# Patient Record
Sex: MALE | Race: BLACK OR AFRICAN AMERICAN | NOT HISPANIC OR LATINO | URBAN - NONMETROPOLITAN AREA
[De-identification: names, ages, dates, MRNs, and addresses within clinical notes are randomized per-mention and may not be internally consistent; named-entity substitution may affect disease eponyms.]

---

## 2023-12-07 ENCOUNTER — OFFICE (OUTPATIENT)
Dept: URBAN - NONMETROPOLITAN AREA CLINIC 1 | Facility: CLINIC | Age: 68
End: 2023-12-07

## 2023-12-07 VITALS
DIASTOLIC BLOOD PRESSURE: 93 MMHG | HEIGHT: 67 IN | WEIGHT: 180 LBS | SYSTOLIC BLOOD PRESSURE: 151 MMHG | HEART RATE: 75 BPM

## 2023-12-07 DIAGNOSIS — I10 ESSENTIAL (PRIMARY) HYPERTENSION: ICD-10-CM

## 2023-12-07 DIAGNOSIS — Z86.010 PERSONAL HISTORY OF COLONIC POLYPS: ICD-10-CM

## 2023-12-07 NOTE — SERVICEHPINOTES
Patient with a history of high blood pressure hyperlipidemia, and colon polyps, presents to the clinic today for colonoscopy consult.  His last colonoscopy was in 2018 by Dr. Mckeon which revealed a 5 mm colon polyp with pathology revealing necrotic debris.  His colonoscopy previous to that in 2015 by Dr. 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  He denies melena, hematochezia, n/v/d, change in bowel, unintentional weight loss, abdominal pain, or constipation.  He denies any upper gi complaints to speak of.  He has a good appetite and has 1-2 BM daily.  He denies cardiac stents or anticoagulation therapy.  Denies a family history history of colon cancer.  br
br   Colonoscopy in 2018-Dr. Mckeon
brFindings: There is sigmoid diverticulosis. 5 millimeter polyp of the transverse colon status post snare polypectomy. The terminal ileum and cecum appeared normal. Otherwise there was no evidence of masses, lesions, polyps, cancers, strictures, inflammatory or vascular changes. Retroflexion showed internal hemorrhoids.
br
br
Colonoscopy in 2015 by Dr. MckeonbrDIAGNOSIS:br  MICROSCOPIC:br   1.  CECAL POLYP, BIOPSIES:br      Tubular adenoma.br   2.  COLON POLYP, HEPATIC FLEXURE:br      Tubulovillous adenoma.

## 2023-12-07 NOTE — SERVICENOTES
Risks of procedure explained to patient and he wishes to proceed.
Bowel prep prescribed and instructions given to patient
We will schedule colonoscopy for polyp surveillance.  
Increase fiber and water intake daily.  
Hypertension-PCP managing.